# Patient Record
Sex: FEMALE | Race: WHITE | NOT HISPANIC OR LATINO | URBAN - METROPOLITAN AREA
[De-identification: names, ages, dates, MRNs, and addresses within clinical notes are randomized per-mention and may not be internally consistent; named-entity substitution may affect disease eponyms.]

---

## 2020-07-09 ENCOUNTER — CONVERSION ENCOUNTER (OUTPATIENT)
Dept: ORTHOPEDIC SURGERY | Facility: CLINIC | Age: 70
End: 2020-07-09

## 2020-07-09 ENCOUNTER — OFFICE VISIT CONVERTED (OUTPATIENT)
Dept: ORTHOPEDIC SURGERY | Facility: CLINIC | Age: 70
End: 2020-07-09
Attending: ORTHOPAEDIC SURGERY

## 2020-07-16 ENCOUNTER — OFFICE VISIT CONVERTED (OUTPATIENT)
Dept: ORTHOPEDIC SURGERY | Facility: CLINIC | Age: 70
End: 2020-07-16
Attending: PHYSICIAN ASSISTANT

## 2020-07-16 ENCOUNTER — CONVERSION ENCOUNTER (OUTPATIENT)
Dept: ORTHOPEDIC SURGERY | Facility: CLINIC | Age: 70
End: 2020-07-16

## 2020-07-30 ENCOUNTER — OFFICE VISIT CONVERTED (OUTPATIENT)
Dept: ORTHOPEDIC SURGERY | Facility: CLINIC | Age: 70
End: 2020-07-30
Attending: PHYSICIAN ASSISTANT

## 2020-08-20 ENCOUNTER — OFFICE VISIT CONVERTED (OUTPATIENT)
Dept: ORTHOPEDIC SURGERY | Facility: CLINIC | Age: 70
End: 2020-08-20
Attending: PHYSICIAN ASSISTANT

## 2020-09-17 ENCOUNTER — OFFICE VISIT CONVERTED (OUTPATIENT)
Dept: ORTHOPEDIC SURGERY | Facility: CLINIC | Age: 70
End: 2020-09-17
Attending: PHYSICIAN ASSISTANT

## 2020-09-17 ENCOUNTER — CONVERSION ENCOUNTER (OUTPATIENT)
Dept: ORTHOPEDIC SURGERY | Facility: CLINIC | Age: 70
End: 2020-09-17

## 2020-10-29 ENCOUNTER — OFFICE VISIT CONVERTED (OUTPATIENT)
Dept: ORTHOPEDIC SURGERY | Facility: CLINIC | Age: 70
End: 2020-10-29
Attending: PHYSICIAN ASSISTANT

## 2020-12-29 ENCOUNTER — OFFICE VISIT CONVERTED (OUTPATIENT)
Dept: ORTHOPEDIC SURGERY | Facility: CLINIC | Age: 70
End: 2020-12-29
Attending: PHYSICIAN ASSISTANT

## 2021-02-09 ENCOUNTER — HOSPITAL ENCOUNTER (OUTPATIENT)
Dept: VACCINE CLINIC | Facility: HOSPITAL | Age: 71
Discharge: HOME | End: 2021-02-09
Attending: INTERNAL MEDICINE

## 2021-03-11 ENCOUNTER — HOSPITAL ENCOUNTER (OUTPATIENT)
Dept: VACCINE CLINIC | Facility: HOSPITAL | Age: 71
Discharge: HOME | End: 2021-03-11
Attending: INTERNAL MEDICINE

## 2021-05-10 NOTE — H&P
"   History and Physical      Patient Name: Faiza Solorio   Patient ID: [id number]   Sex: Female   YOB: 1950        Visit Date: July 9, 2020    Provider: Angel Godinez MD   Location: [facility]   Location Address: [address] [place] [address]  [id number]   Location Phone: [phone]          Chief Complaint  · Right wrist /shoulder injury      History Of Present Illness  Faiza Solorio is a 70 year old /White female who presents today to [facility]. She is here for evaluation of her right shoulder and wrist injury. Yesterday she was helping with cattle, gate door hit her and knocked her to the ground, had resultant pain. She has a right proximal humerus fracture, some mild displacement and questionable radial styloid fracture.       Past Surgical History  Surgical Clips         Medication List  Norco 7.5-325 mg oral tablet         Allergy List  NO KNOWN DRUG ALLERGIES; NO KNOWN DRUG ALLERGIES         Family Medical History  Heart Disease; Cancer, Unspecified; Diabetes, unspecified type         Social History  Alcohol Use (Never); lives with other; Recreational Drug Use (Never); Retired.; Single.; Tobacco (Never)         Review of Systems  · Constitutional  o Denies  o : fever, chills, weight loss  · Cardiovascular  o Denies  o : chest pain, shortness of breath  · Gastrointestinal  o Denies  o : liver disease, heartburn, nausea, blood in stools  · Genitourinary  o Denies  o : painful urination, blood in urine  · Integument  o Denies  o : rash, itching  · Neurologic  o Denies  o : headache, weakness, loss of consciousness  · Musculoskeletal  o Denies  o : painful, swollen joints  · Psychiatric  o Denies  o : drug/alcohol addiction, anxiety, depression      Vitals  Date Time BP Position Site L\R Cuff Size HR RR TEMP (F) WT  HT  BMI kg/m2 BSA m2 O2 Sat HC       07/09/2020 08:08 AM      78 - R   198lbs 0oz 5'  9\" 29.24 2.09 95 %          Physical " Examination  · Constitutional  o Appearance  o : well developed, well-nourished, no obvious deformities present  · Head and Face  o Head  o :   § Inspection  § : normocephalic  o Face  o :   § Inspection  § : no facial lesions  · Eyes  o Conjunctivae  o : conjunctivae normal  o Sclerae  o : sclerae white  · Ears, Nose, Mouth and Throat  o Ears  o :   § External Ears  § : appearance within normal limits  § Hearing  § : intact  o Nose  o :   § External Nose  § : appearance normal  · Neck  o Inspection/Palpation  o : normal appearance  o Range of Motion  o : full range of motion  · Respiratory  o Respiratory Effort  o : breathing unlabored  o Inspection of Chest  o : normal appearance  o Auscultation of Lungs  o : no audible wheezing or rales  · Cardiovascular  o Heart  o : regular rate  · Gastrointestinal  o Abdominal Examination  o : soft and non-tender  · Skin and Subcutaneous Tissue  o General Inspection  o : intact, no rashes  · Psychiatric  o General  o : Alert and oriented x3  o Judgement and Insight  o : judgment and insight intact  o Mood and Affect  o : mood normal, affect appropriate  · Right Shoulder  o Inspection  o : She is comfortable in her sling with mild to moderate swelling. Mild bruising.   · Right Wrist  o Inspection  o : Mild tenderness over her radial styloid, though minimal. No real fingers swelling.           Assessment  · Fracture, right proximal humerus     812.20/S42.309A  · Pain: Right Shoulder     719.41/M25.519  · Pain: Right Wrist     719.43/M25.539  · Wrist sprain, right, initial encounter     842.00/S63.501A      Plan  · Medications  o Medications have been Reconciled  o Transition of Care or Provider Policy  · Instructions  o Reviewed the patient's Past Medical, Social, and Family history as well as the ROS at today's visit, no changes.  o Call or return if worsening symptoms.  o Going to see her back in a week, get repeat films.             Electronically Signed by: Shirley Arce  -Author on July 10, 2020 10:59:54 AM  Electronically Co-signed by: Angel Godinez MD -Reviewer on July 10, 2020 11:00:51 AM

## 2021-05-13 NOTE — PROGRESS NOTES
"   Progress Note      Patient Name: Faiza Solorio   Patient ID: [id number]   Sex: Female   YOB: 1950    Referring Provider: Angel Godinez MD    Visit Date: July 30, 2020    Provider: Akilah Branch PA-C   Location: [facility]   Location Address: [address] [place] [address]  [id number]   Location Phone: [phone]          Chief Complaint  · Follow up right shoulder       History Of Present Illness  Faiza Solorio is a 70 year old /White female who presents today to [facility].      She is following up for right proximal humerus fracture and radial styloid fracture sustained 7/8/20, when cattle gait knocked her to the ground. She states improved pain in her shoulder and wrist is nonpainful.       Past Surgical History  Surgical Clips         Medication List  Norco 7.5-325 mg oral tablet         Allergy List  NO KNOWN DRUG ALLERGIES; NO KNOWN DRUG ALLERGIES       Allergies Reconciled  Family Medical History  Heart Disease; Cancer, Unspecified; Diabetes, unspecified type         Social History  Alcohol Use (Never); lives with other; Recreational Drug Use (Never); Retired.; Single.; Tobacco (Never)         Review of Systems  · Constitutional  o Denies  o : fever, chills, weight loss  · Cardiovascular  o Denies  o : chest pain, shortness of breath  · Gastrointestinal  o Denies  o : liver disease, heartburn, nausea, blood in stools  · Genitourinary  o Denies  o : painful urination, blood in urine  · Integument  o Denies  o : rash, itching  · Neurologic  o Denies  o : headache, weakness, loss of consciousness  · Musculoskeletal  o Admits  o : painful, swollen joints  · Psychiatric  o Denies  o : drug/alcohol addiction, anxiety, depression      Vitals  Date Time BP Position Site L\R Cuff Size HR RR TEMP (F) WT  HT  BMI kg/m2 BSA m2 O2 Sat HC       07/30/2020 08:12 AM      78 - R   216lbs 0oz 5'  9\" 31.9 2.18 99 %          Physical Examination  · Constitutional  o Appearance  o : " well developed, well-nourished, no obvious deformities present  · Head and Face  o Head  o :   § Inspection  § : normocephalic  o Face  o :   § Inspection  § : no facial lesions  · Eyes  o Conjunctivae  o : conjunctivae normal  o Sclerae  o : sclerae white  · Ears, Nose, Mouth and Throat  o Ears  o :   § External Ears  § : appearance within normal limits  § Hearing  § : intact  o Nose  o :   § External Nose  § : appearance normal  · Neck  o Inspection/Palpation  o : normal appearance  o Range of Motion  o : full range of motion  · Respiratory  o Respiratory Effort  o : breathing unlabored  o Inspection of Chest  o : normal appearance  o Auscultation of Lungs  o : no audible wheezing or rales  · Cardiovascular  o Heart  o : regular rate  · Gastrointestinal  o Abdominal Examination  o : soft and non-tender  · Skin and Subcutaneous Tissue  o General Inspection  o : intact, no rashes  · Psychiatric  o General  o : Alert and oriented x3  o Judgement and Insight  o : judgment and insight intact  o Mood and Affect  o : mood normal, affect appropriate  · Right Shoulder  o Inspection  o : Mild swelling. Tender anterior shoulder. Bruising is resolving. ROM is limited and painful. Good elbow and wrist ROM. Wrist is nontender. All compartments soft and well perfused. Sensation grossly intact.   · In Office Procedures  o View  o : AP/LATERAL  o Site  o : right, shoulder   o Indication  o : Right shoulder pain   o Study  o : X-rays ordered, taken in the office, and reviewed today.  o Xray  o : Early healing proximal humerus fracture. More pronounced nondisplaced oblique proximal humeral shaft fracture, that appears stable.   o Comparative Data  o : Comparative Data found and reviewed today           Assessment  · Pain: Shoulder     719.41/M25.519  · Proximal humerus fracture     812.00/S42.209A      Plan  · Orders  o Scapula (Right) [facility] Preferred View ([id number]) - 719.41/M25.519 - 07/30/2020  · Medications  o Medications have  been Reconciled  o Transition of Care or Provider Policy  · Instructions  o Reviewed the patient's Past Medical, Social, and Family history as well as the ROS at today's visit, no changes.  o Call or return if worsening symptoms.  o Continue sling for ambulation. May removed at rest. D/C wrist brace. May begin gentle ROM exercises. Start PT after next visit. Follow up 3 weeks, repeat x-ray.  o Electronically Identified Patient Education Materials Provided Electronically            Electronically Signed by: ELOY Turner-C -Author on July 30, 2020 08:44:36 AM  Electronically Co-signed by: Angel Godinez MD -Reviewer on July 31, 2020 08:48:26 AM

## 2021-05-13 NOTE — PROGRESS NOTES
"   Progress Note      Patient Name: Faiza Solorio   Patient ID: [id number]   Sex: Female   YOB: 1950    Referring Provider: Angel Godinez MD    Visit Date: September 17, 2020    Provider: Akilah Branch PA-C   Location: [facility]   Location Address: [address] [place] [address]  [id number]   Location Phone: [phone]          Chief Complaint  · Follow up right shoulder pain      History Of Present Illness  Faiza Solorio is a 70 year old /White female who presents today to [facility].      She is here for follow up for right proximal humerus fractures sustained 7/8/20. She denies pain at rest. She is making progress with PT. She is compliant with HEP. She is able to most ADLs and housework including peeling bushel of apples.       Past Surgical History  Surgical Clips         Medication List  Norco 7.5-325 mg oral tablet         Allergy List  NO KNOWN DRUG ALLERGIES; NO KNOWN DRUG ALLERGIES       Allergies Reconciled  Family Medical History  Heart Disease; Cancer, Unspecified; Diabetes, unspecified type         Social History  Alcohol Use (Never); lives with other; Recreational Drug Use (Never); Retired.; Single.; Tobacco (Never)         Review of Systems  · Constitutional  o Denies  o : fever, chills, weight loss  · Cardiovascular  o Denies  o : chest pain, shortness of breath  · Gastrointestinal  o Denies  o : liver disease, heartburn, nausea, blood in stools  · Genitourinary  o Denies  o : painful urination, blood in urine  · Integument  o Denies  o : rash, itching  · Neurologic  o Denies  o : headache, weakness, loss of consciousness  · Musculoskeletal  o Admits  o : painful, swollen joints  · Psychiatric  o Denies  o : drug/alcohol addiction, anxiety, depression      Vitals  Date Time BP Position Site L\R Cuff Size HR RR TEMP (F) WT  HT  BMI kg/m2 BSA m2 O2 Sat HC       09/17/2020 08:16 AM      91 - R   204lbs 16oz 5'  9\" 30.27 2.13 98 %          Physical " Examination  · Constitutional  o Appearance  o : well developed, well-nourished, no obvious deformities present  · Head and Face  o Head  o :   § Inspection  § : normocephalic  o Face  o :   § Inspection  § : no facial lesions  · Eyes  o Conjunctivae  o : conjunctivae normal  o Sclerae  o : sclerae white  · Ears, Nose, Mouth and Throat  o Ears  o :   § External Ears  § : appearance within normal limits  § Hearing  § : intact  o Nose  o :   § External Nose  § : appearance normal  · Neck  o Inspection/Palpation  o : normal appearance  o Range of Motion  o : full range of motion  · Respiratory  o Respiratory Effort  o : breathing unlabored  o Inspection of Chest  o : normal appearance  o Auscultation of Lungs  o : no audible wheezing or rales  · Cardiovascular  o Heart  o : regular rate  · Gastrointestinal  o Abdominal Examination  o : soft and non-tender  · Skin and Subcutaneous Tissue  o General Inspection  o : intact, no rashes  · Psychiatric  o General  o : Alert and oriented x3  o Judgement and Insight  o : judgment and insight intact  o Mood and Affect  o : mood normal, affect appropriate  · Right Shoulder  o Inspection  o : + deltoid atrophy. Sensation grossly intact. Forward flexion 100 degrees. Abduction 100 degrees. ER 45 degrees. IR to ASIS. Strength decreased.   · In Office Procedures  o View  o : AP/LATERAL  o Site  o : right, shoulder   o Indication  o : Right shoulder pain   o Study  o : X-rays ordered, taken in the office, and reviewed today.  o Xray  o : Healing of proximal humerus fracture.   o Comparative Data  o : Comparative Data found and reviewed today           Assessment  · Pain: Shoulder     719.41/M25.519  · Proximal humerus fracture     812.00/S42.209A      Plan  · Orders  o Scapula (Right) [facility] Preferred View ([id number]) - 719.41/M25.519 - 09/17/2020  · Instructions  o Reviewed the patient's Past Medical, Social, and Family history as well as the ROS at today's visit, no changes.  o Call or  return if worsening symptoms.  o Continue PT. Follow up in 6 weeks. Final x-rays.             Electronically Signed by: ELOY Turner-ANT -Author on September 17, 2020 08:44:49 AM  Electronically Co-signed by: Angel Godinez MD -Reviewer on September 17, 2020 12:42:34 PM

## 2021-05-13 NOTE — PROGRESS NOTES
"   Progress Note      Patient Name: Faiza Solorio   Patient ID: [id number]   Sex: Female   YOB: 1950        Visit Date: August 20, 2020    Provider: Akilah Branch PA-C   Location: [facility]   Location Address: [address] [place] [address]  [id number]   Location Phone: [phone]          Chief Complaint  · Follow up right shoulder injury      History Of Present Illness  Faiza Solorio is a 70 year old /White female who presents today to [facility].      She is following up for right proximal humerus fracture and radial styloid fracture sustained 7/8/20, when cattle gait knocked her to the ground. She states improved pain in her shoulder and wrist is nonpainful. Her motion is improving.             Past Surgical History  Surgical Clips         Medication List  Norco 7.5-325 mg oral tablet         Allergy List  NO KNOWN DRUG ALLERGIES; NO KNOWN DRUG ALLERGIES       Allergies Reconciled  Family Medical History  Heart Disease; Cancer, Unspecified; Diabetes, unspecified type         Social History  Alcohol Use (Never); lives with other; Recreational Drug Use (Never); Retired.; Single.; Tobacco (Never)         Review of Systems  · Constitutional  o Denies  o : fever, chills, weight loss  · Cardiovascular  o Denies  o : chest pain, shortness of breath  · Gastrointestinal  o Denies  o : liver disease, heartburn, nausea, blood in stools  · Genitourinary  o Denies  o : painful urination, blood in urine  · Integument  o Denies  o : rash, itching  · Neurologic  o Denies  o : headache, weakness, loss of consciousness  · Musculoskeletal  o Admits  o : painful, swollen joints  · Psychiatric  o Denies  o : drug/alcohol addiction, anxiety, depression      Vitals  Date Time BP Position Site L\R Cuff Size HR RR TEMP (F) WT  HT  BMI kg/m2 BSA m2 O2 Sat HC       08/20/2020 08:10 AM      76 - R   217lbs 0oz 5'  9\" 32.04 2.19 97 %          Physical " Examination  · Constitutional  o Appearance  o : well developed, well-nourished, no obvious deformities present  · Head and Face  o Head  o :   § Inspection  § : normocephalic  o Face  o :   § Inspection  § : no facial lesions  · Eyes  o Conjunctivae  o : conjunctivae normal  o Sclerae  o : sclerae white  · Ears, Nose, Mouth and Throat  o Ears  o :   § External Ears  § : appearance within normal limits  § Hearing  § : intact  o Nose  o :   § External Nose  § : appearance normal  · Neck  o Inspection/Palpation  o : normal appearance  o Range of Motion  o : full range of motion  · Respiratory  o Respiratory Effort  o : breathing unlabored  o Inspection of Chest  o : normal appearance  o Auscultation of Lungs  o : no audible wheezing or rales  · Cardiovascular  o Heart  o : regular rate  · Gastrointestinal  o Abdominal Examination  o : soft and non-tender  · Skin and Subcutaneous Tissue  o General Inspection  o : intact, no rashes  · Psychiatric  o General  o : Alert and oriented x3  o Judgement and Insight  o : judgment and insight intact  o Mood and Affect  o : mood normal, affect appropriate  · Right Shoulder  o Inspection  o : Bruising resolved. Nontender. Near full passive ROM. Strength decreased. All compartments well perfused. Sensation grossly intact.   · In Office Procedures  o View  o : AP/LATERAL  o Site  o : right, shoulder   o Indication  o : Right shoulder pain   o Study  o : X-rays ordered, taken in the office, and reviewed today.  o Xray  o : Good healing of proximal humerus fracture  o Comparative Data  o : Comparative Data found and reviewed today           Assessment  · Pain: Shoulder     719.41/M25.519  · Proximal humerus fracture     812.00/S42.209A      Plan  · Orders  o Scapula (Right) [facility] Preferred View ([id number]) - 719.41/M25.519 - 08/20/2020  · Medications  o Medications have been Reconciled  o Transition of Care or Provider Policy  · Instructions  o Reviewed the patient's Past Medical,  Social, and Family history as well as the ROS at today's visit, no changes.  o Call or return if worsening symptoms.  o Start PT. Follow Up in 4 weeks. Repeat x-ray.            Electronically Signed by: ELOY Turner-ANT -Author on August 20, 2020 08:39:05 AM  Electronically Co-signed by: Angel Godinez MD -Reviewer on August 20, 2020 02:13:37 PM

## 2021-05-13 NOTE — PROGRESS NOTES
"   Progress Note      Patient Name: Faiza Solorio   Patient ID: [id number]   Sex: Female   YOB: 1950        Visit Date: October 29, 2020    Provider: Wendy Hills PA-C   Location: [facility]   Location Address: [address] [place] [address]  [id number]   Location Phone: [phone]          Chief Complaint  · Follow up right shoulder pain      History Of Present Illness  Faiza Solorio is a 70 year old /White female who presents today to [facility].      Patient is following up for right proximal humerus fractures sustained 7/8/20. Patient states minimal pain. Patient states improving range of motion with physical therapy.       Past Surgical History  Surgical Clips         Medication List  Norco 7.5-325 mg oral tablet         Allergy List  NO KNOWN DRUG ALLERGIES; NO KNOWN DRUG ALLERGIES         Family Medical History  Heart Disease; Cancer, Unspecified; Diabetes, unspecified type         Social History  Alcohol Use (Never); lives with other; Recreational Drug Use (Never); Retired.; Single.; Tobacco (Never)         Review of Systems  · Constitutional  o Denies  o : fever, chills, weight loss  · Cardiovascular  o Denies  o : chest pain, shortness of breath  · Gastrointestinal  o Denies  o : liver disease, heartburn, nausea, blood in stools  · Genitourinary  o Denies  o : painful urination, blood in urine  · Integument  o Denies  o : rash, itching  · Neurologic  o Denies  o : headache, weakness, loss of consciousness  · Musculoskeletal  o Denies  o : painful, swollen joints  · Psychiatric  o Denies  o : drug/alcohol addiction, anxiety, depression      Vitals  Date Time BP Position Site L\R Cuff Size HR RR TEMP (F) WT  HT  BMI kg/m2 BSA m2 O2 Sat FR L/min FiO2        10/29/2020 08:40 AM      78 - R   218lbs 0oz 5'  9\" 32.19 2.19 92 %            Physical Examination  · Constitutional  o Appearance  o : well developed, well-nourished, no obvious deformities " present  · Head and Face  o Head  o :   § Inspection  § : normocephalic  o Face  o :   § Inspection  § : no facial lesions  · Eyes  o Conjunctivae  o : conjunctivae normal  o Sclerae  o : sclerae white  · Ears, Nose, Mouth and Throat  o Ears  o :   § External Ears  § : appearance within normal limits  § Hearing  § : intact  o Nose  o :   § External Nose  § : appearance normal  · Neck  o Inspection/Palpation  o : normal appearance  o Range of Motion  o : full range of motion  · Respiratory  o Respiratory Effort  o : breathing unlabored  o Inspection of Chest  o : normal appearance  o Auscultation of Lungs  o : no audible wheezing or rales  · Cardiovascular  o Heart  o : regular rate  · Gastrointestinal  o Abdominal Examination  o : soft and non-tender  · Skin and Subcutaneous Tissue  o General Inspection  o : intact, no rashes  · Psychiatric  o General  o : Alert and oriented x3  o Judgement and Insight  o : judgment and insight intact  o Mood and Affect  o : mood normal, affect appropriate  · In Office Procedures  o View  o : AP/LATERAL  o Site  o : right, shoulder   o Indication  o : Right shoulder pain   o Study  o : X-rays ordered, taken in the office, and reviewed today.  o Xray  o : well healed proximal humerus  o Comparative Data  o : Comparative Data found and reviewed today   · Right Shoulder-Street  o Inspection  o : No swelling, no erythema, no ecchymosis, no atrophy  o Palpation  o : no tenderness at sternoclavicular joint, no tenderness at clavicle, no tenderness of acromioclavicular joint, no tenderness at greater tuberosity, no tenderness at subacromial bursae, no tenderness at biciptal groove  o ROM  o : Full extension, full flexion, full abduction, limited internal rotation , limited external rotation, no winging of scapula, no scapular dyskinesis, full cervical ROM , full cross body adduction  o Strength  o : full extension, full flexion, full abduction, weak external rotation, weak internal  rotation, full empty can test, full lift off test, full drop arm test  o Neurovascular  o : Full sensation, radial pulse 2+, ulnar pulse 2+          Assessment  · Fracture, humerus     812.20/S42.309A  · Pain: Shoulder     719.41/M25.519      Plan  · Orders  o Scapula (Right) [facility] Preferred View ([id number]) - 719.41/M25.519 - 10/29/2020  · Medications  o Medications have been Reconciled  o Transition of Care or Provider Policy  · Instructions  o Reviewed the patient's Past Medical, Social, and Family history as well as the ROS at today's visit, no changes.  o Call or return if worsening symptoms.  o X-ray ordered, taken and reviewed at this visit.  o Follow up in 2 months.  o Electronically Identified Patient Education Materials Provided Electronically     Continue physical therapy  Follow up to check ROM             Electronically Signed by: ELOY Jones-ANT -Author on October 29, 2020 08:56:44 AM  Electronically Co-signed by: Angel Godinez MD -Reviewer on October 30, 2020 06:47:30 AM

## 2021-05-13 NOTE — PROGRESS NOTES
"   Progress Note      Patient Name: Faiza Solorio   Patient ID: [id number]   Sex: Female   YOB: 1950    Referring Provider: Angel Godinez MD    Visit Date: July 16, 2020    Provider: Akilah Branch PA-C   Location: [facility]   Location Address: [address] [place] [address]  [id number]   Location Phone: [phone]          Chief Complaint  · Follow up right shoulder injury      History Of Present Illness  Faiza Solorio is a 70 year old /White female who presents today to [facility].      She is following up for right proximal humerus fracture and radial styloid fracture sustained 7/8/20, when cattle gait knocked her to the ground. She has been using sling and splint. She states pain is improved. She denies numbness/tingling.       Past Surgical History  Surgical Clips         Medication List  Norco 7.5-325 mg oral tablet         Allergy List  NO KNOWN DRUG ALLERGIES; NO KNOWN DRUG ALLERGIES         Family Medical History  Heart Disease; Cancer, Unspecified; Diabetes, unspecified type         Social History  Alcohol Use (Never); lives with other; Recreational Drug Use (Never); Retired.; Single.; Tobacco (Never)         Review of Systems  · Constitutional  o Denies  o : fever, chills, weight loss  · Cardiovascular  o Denies  o : chest pain, shortness of breath  · Gastrointestinal  o Denies  o : liver disease, heartburn, nausea, blood in stools  · Genitourinary  o Denies  o : painful urination, blood in urine  · Integument  o Denies  o : rash, itching  · Neurologic  o Denies  o : headache, weakness, loss of consciousness  · Musculoskeletal  o Admits  o : painful, swollen joints  · Psychiatric  o Denies  o : drug/alcohol addiction, anxiety, depression      Vitals  Date Time BP Position Site L\R Cuff Size HR RR TEMP (F) WT  HT  BMI kg/m2 BSA m2 O2 Sat        07/16/2020 08:04 AM      77 - R   197lbs 0oz 5'  9\" 29.09 2.09 97 %          Physical " Examination  · Constitutional  o Appearance  o : well developed, well-nourished, no obvious deformities present  · Head and Face  o Head  o :   § Inspection  § : normocephalic  o Face  o :   § Inspection  § : no facial lesions  · Eyes  o Conjunctivae  o : conjunctivae normal  o Sclerae  o : sclerae white  · Ears, Nose, Mouth and Throat  o Ears  o :   § External Ears  § : appearance within normal limits  § Hearing  § : intact  o Nose  o :   § External Nose  § : appearance normal  · Neck  o Inspection/Palpation  o : normal appearance  o Range of Motion  o : full range of motion  · Respiratory  o Respiratory Effort  o : breathing unlabored  o Inspection of Chest  o : normal appearance  o Auscultation of Lungs  o : no audible wheezing or rales  · Cardiovascular  o Heart  o : regular rate  · Gastrointestinal  o Abdominal Examination  o : soft and non-tender  · Skin and Subcutaneous Tissue  o General Inspection  o : intact, no rashes  · Psychiatric  o General  o : Alert and oriented x3  o Judgement and Insight  o : judgment and insight intact  o Mood and Affect  o : mood normal, affect appropriate  · Right Shoulder  o Inspection  o : + Ecchymosis. + tenderness. Limited ROM. Elbow near full ROM. All compartments soft and well perfused. Sensation grossly intact.   · Right Wrist  o Inspection  o : No discoloration or swelling. Nontender. Full ROM. Neurovasculalry intact.   · In Office Procedures  o View  o : AP/LATERAL  o Site  o : right, shoulder   o Indication  o : Right shoulder pain   o Study  o : X-rays ordered, taken in the office, and reviewed today.  o Xray  o : Stable proximal humerus fracture. With possible nondisplaced oblique shaft fracture.   o Comparative Data  o : Comparative Data found and reviewed today           Assessment  · Pain: Shoulder     719.41/M25.519  · Proximal humerus fracture     812.00/S42.209A  · Sprain of wrist     842.00/S63.509A      Plan  · Orders  o [place] (Right) [facility] Preferred View  ([id number]) - 719.41/M25.519 - 07/16/2020  · Medications  o Medications have been Reconciled  o Transition of Care or Provider Policy  · Instructions  o Dr. Godinez saw and examined the patient and agrees with plan.   o Reviewed the patient's Past Medical, Social, and Family history as well as the ROS at today's visit, no changes.  o Call or return if worsening symptoms.  o Continue conservative treatment. Gentle shoulder ROM demonstrated. Wrist brace provided. Repeat x-rays of shoulder 2 weeks.   o Electronically Identified Patient Education Materials Provided Electronically            Electronically Signed by: ELOY Turner-C -Author on July 16, 2020 08:32:11 AM  Electronically Co-signed by: Angel Godinez MD -Reviewer on July 17, 2020 09:38:51 AM

## 2021-05-14 VITALS — BODY MASS INDEX: 32.14 KG/M2 | OXYGEN SATURATION: 97 % | WEIGHT: 217 LBS | HEIGHT: 69 IN | HEART RATE: 76 BPM

## 2021-05-14 VITALS — BODY MASS INDEX: 29.62 KG/M2 | HEART RATE: 66 BPM | HEIGHT: 69 IN | WEIGHT: 200 LBS | OXYGEN SATURATION: 96 %

## 2021-05-14 VITALS — BODY MASS INDEX: 30.36 KG/M2 | HEIGHT: 69 IN | WEIGHT: 205 LBS | HEART RATE: 91 BPM | OXYGEN SATURATION: 98 %

## 2021-05-14 VITALS — HEART RATE: 78 BPM | HEIGHT: 69 IN | BODY MASS INDEX: 32.29 KG/M2 | OXYGEN SATURATION: 92 % | WEIGHT: 218 LBS

## 2021-05-14 NOTE — PROGRESS NOTES
"   Progress Note      Patient Name: Faiza Solorio   Patient ID: [id number]   Sex: Female   YOB: 1950    Referring Provider: Angel Godinez MD    Visit Date: December 29, 2020    Provider: Akilah Branch PA-C   Location: [facility]   Location Address: [address] [place] [address]  [id number]   Location Phone: [phone]          Chief Complaint  · Follow up right shoulder/arm pain      History Of Present Illness  Faiza Solorio is a 70 year old /White female who presents today to [facility].      She is here for follow up for right proximal humerus fracture sustained in July 2020. She states no pain and motion is improving.       Past Surgical History  Surgical Clips         Medication List  Norco 7.5-325 mg oral tablet         Allergy List  NO KNOWN DRUG ALLERGIES; NO KNOWN DRUG ALLERGIES       Allergies Reconciled  Family Medical History  Heart Disease; Cancer, Unspecified; Diabetes, unspecified type         Social History  Alcohol Use (Never); lives with other; Recreational Drug Use (Never); Retired.; Single.; Tobacco (Never)         Review of Systems  · Constitutional  o Denies  o : fever, chills, weight loss  · Cardiovascular  o Denies  o : chest pain, shortness of breath  · Gastrointestinal  o Denies  o : liver disease, heartburn, nausea, blood in stools  · Genitourinary  o Denies  o : painful urination, blood in urine  · Integument  o Denies  o : rash, itching  · Neurologic  o Denies  o : headache, weakness, loss of consciousness  · Musculoskeletal  o Denies  o : painful, swollen joints  · Psychiatric  o Denies  o : drug/alcohol addiction, anxiety, depression      Vitals  Date Time BP Position Site L\R Cuff Size HR RR TEMP (F) WT  HT  BMI kg/m2 BSA m2 O2 Sat FR L/min FiO2 HC       12/29/2020 08:45 AM      66 - R   200lbs 0oz 5'  9\" 29.53 2.1 96 %            Physical Examination  · Constitutional  o Appearance  o : well developed, well-nourished, no obvious " deformities present  · Head and Face  o Head  o :   § Inspection  § : normocephalic  o Face  o :   § Inspection  § : no facial lesions  · Eyes  o Conjunctivae  o : conjunctivae normal  o Sclerae  o : sclerae white  · Ears, Nose, Mouth and Throat  o Ears  o :   § External Ears  § : appearance within normal limits  § Hearing  § : intact  o Nose  o :   § External Nose  § : appearance normal  · Neck  o Inspection/Palpation  o : normal appearance  o Range of Motion  o : full range of motion  · Respiratory  o Respiratory Effort  o : breathing unlabored  o Inspection of Chest  o : normal appearance  o Auscultation of Lungs  o : no audible wheezing or rales  · Cardiovascular  o Heart  o : regular rate  · Gastrointestinal  o Abdominal Examination  o : soft and non-tender  · Skin and Subcutaneous Tissue  o General Inspection  o : intact, no rashes  · Psychiatric  o General  o : Alert and oriented x3  o Judgement and Insight  o : judgment and insight intact  o Mood and Affect  o : mood normal, affect appropriate  · Right Shoulder  o Inspection  o : No deformity or discoloration. Nontender. Near full ROM compared to the left. Neurovascularly intact.           Assessment  · Pain: Arm     729.5/M79.603  · Pain: Shoulder     719.41/M25.519  · Proximal humerus fracture     812.00/S42.209A      Plan  · Medications  o Medications have been Reconciled  o Transition of Care or Provider Policy  · Instructions  o Reviewed the patient's Past Medical, Social, and Family history as well as the ROS at today's visit, no changes.  o Call or return if worsening symptoms.  o Continue HEP. Follow up PRN.  o Electronically Identified Patient Education Materials Provided Electronically            Electronically Signed by: ELOY Turner-C -Author on December 29, 2020 09:47:03 AM  Electronically Co-signed by: Angel Godinez MD -Reviewer on December 29, 2020 12:46:16 PM

## 2021-05-15 VITALS — HEART RATE: 78 BPM | HEIGHT: 69 IN | BODY MASS INDEX: 29.33 KG/M2 | WEIGHT: 198 LBS | OXYGEN SATURATION: 95 %

## 2021-05-15 VITALS — BODY MASS INDEX: 31.99 KG/M2 | WEIGHT: 216 LBS | HEART RATE: 78 BPM | OXYGEN SATURATION: 99 % | HEIGHT: 69 IN

## 2021-05-15 VITALS — OXYGEN SATURATION: 97 % | WEIGHT: 197 LBS | HEIGHT: 69 IN | HEART RATE: 77 BPM | BODY MASS INDEX: 29.18 KG/M2
